# Patient Record
(demographics unavailable — no encounter records)

---

## 2024-11-07 NOTE — ASSESSMENT
[FreeTextEntry1] : Patient will follow up with neurology, from a cardiac standpoint is clinically stable. Recent ischemic workup was negative.

## 2024-11-07 NOTE — DISCUSSION/SUMMARY
[Hypertension] : hypertension [Stable] : stable [None] : There are no changes in medication management [Exercise Regimen] : an exercise regimen [Dietary Modification] : dietary modification [Low Sodium Diet] : low sodium diet [Patient] : the patient [FreeTextEntry1] : feeling well no episodes of syncope/near-syncope neuro eval negative 1)HTN; -cont diovan 2)HL: -cont simva 3)TIA: cont asa/plavix 4)LE venous insufficiency and pain: -referred to Dr. Mims  [EKG obtained to assist in diagnosis and management of assessed problem(s)] : EKG obtained to assist in diagnosis and management of assessed problem(s)

## 2024-11-07 NOTE — HISTORY OF PRESENT ILLNESS
[FreeTextEntry1] : Gay is a 67-year-old with history of hypothyroidism, hypertension and TIA history comes in today for cardiac follow-up visit.  No current chest symptoms reported and she is eating generally healthy.   11/1/23: Patient recently admitted to [facility] for syncope. full cardiac workup performed. Echo was WNL, Duplex of LE negative for DVT, or occlusive disease. Labs within acceptable limit. Presents today for follow up. Denies active s/sx of CP, SOB, Palpitations, SUTTON, Edema  5/7/24: feeling well no episodes of syncope/near-syncope neuro eval negative

## 2024-11-07 NOTE — CARDIOLOGY SUMMARY
[de-identified] : Sinus Rhythm  Low voltage in precordial leads. -Poor R-wave progression -may be secondary to pulmonary disease  consider old anterior infarct.  ABNORMAL

## 2024-11-26 NOTE — HISTORY OF PRESENT ILLNESS
[de-identified] : Ms. CHATO WOLFF is a 78 year old female presenting for evaluation of right hip and right knee pain. Patient had right knee pain localized anteriorly and medially. She has right hip pain localized mostly laterally. She denies any groin pain at this time. Patient denies any falls or trauma. Her symptoms are worse with weightbearing activity as well as with laying on the right side. She has not had injections thus far. She has tried PT and NSAIDs without improvement.

## 2024-11-26 NOTE — DISCUSSION/SUMMARY
[de-identified] :  CHATO WOLFF is a 78 year old female who presents with right hip GT bursitis and right knee mild varus arthritis and moderate patellofemoral compartment arthritis.  Nonoperative treatment options for knee arthritis were discussed including anti-inflammatories, physical therapy, intraarticular cortisone injections, and hyaluronic acid gel injections.  Nonoperative treatment options for GT bursitis were discussed including anti-inflammatories, physical therapy, and cortisone injections.  A prescription for physical therapy was provided.  She cannot have Mobic due to her being on Plavix. She will use Tylenol for relief. She declined any injections. She will follow up as needed.

## 2024-11-26 NOTE — PHYSICAL EXAM
[de-identified] :  The patient appears well nourished and in no apparent distress. The patient is alert and oriented to person, place, and time. Affect and mood appear normal. The head is normocephalic and atraumatic. The eyes reveal normal sclera and extra ocular muscles are intact. The tongue is midline with no apparent lesions. Skin shows normal turgor with no evidence of eczema or psoriasis. No respiratory distress noted. Sensation grossly intact. [de-identified] :  Exam of the right hip shows hip external rotation of 30 degrees, hip internal rotation of 30 degrees.  There is tenderness of the greater trochanteric bursa. There is lateral hip pain with external rotation.  Exam of the right knee shows 0 to 125 degrees of flexion measured with a goniometer.  There is patellofemoral crepitance.  There is a small effusion. 5/5 motor strength bilaterally distally. Sensation intact distally. [de-identified] : X-ray: AP of the pelvis and 2 views of the right hip demonstrate well preserved joint space X-ray: 4 views of the right knee demonstrate moderate patellofemoral compartment arthritis and mild varus arthritis

## 2024-11-26 NOTE — CONSULT LETTER
[Dear  ___] : Dear  [unfilled], [Consult Letter:] : I had the pleasure of evaluating your patient, [unfilled]. [Please see my note below.] : Please see my note below. [Consult Closing:] : Thank you very much for allowing me to participate in the care of this patient.  If you have any questions, please do not hesitate to contact me. [Sincerely,] : Sincerely, [FreeTextEntry2] : JOSE CLARK MD [FreeTextEntry3] : Sudheer Arriola MD Chief of Joint Replacement Primary & Revision Hip and Knee Replacement  [facility] [place]